# Patient Record
Sex: FEMALE | Race: BLACK OR AFRICAN AMERICAN | NOT HISPANIC OR LATINO | URBAN - METROPOLITAN AREA
[De-identification: names, ages, dates, MRNs, and addresses within clinical notes are randomized per-mention and may not be internally consistent; named-entity substitution may affect disease eponyms.]

---

## 2018-10-03 ENCOUNTER — OUTPATIENT (OUTPATIENT)
Dept: OUTPATIENT SERVICES | Facility: HOSPITAL | Age: 78
LOS: 1 days | End: 2018-10-03
Payer: COMMERCIAL

## 2018-10-03 DIAGNOSIS — E11.319 TYPE 2 DIABETES MELLITUS WITH UNSPECIFIED DIABETIC RETINOPATHY WITHOUT MACULAR EDEMA: ICD-10-CM

## 2018-10-03 DIAGNOSIS — R06.09 OTHER FORMS OF DYSPNEA: ICD-10-CM

## 2018-10-03 PROCEDURE — A9505: CPT

## 2018-10-03 PROCEDURE — 78452 HT MUSCLE IMAGE SPECT MULT: CPT

## 2018-10-03 PROCEDURE — A9500: CPT

## 2018-10-03 PROCEDURE — 93017 CV STRESS TEST TRACING ONLY: CPT

## 2021-03-29 ENCOUNTER — INPATIENT (INPATIENT)
Facility: HOSPITAL | Age: 81
LOS: 0 days | Discharge: HOME | DRG: 247 | End: 2021-03-30
Attending: INTERNAL MEDICINE | Admitting: INTERNAL MEDICINE
Payer: COMMERCIAL

## 2021-03-29 VITALS
DIASTOLIC BLOOD PRESSURE: 67 MMHG | WEIGHT: 175.05 LBS | TEMPERATURE: 98 F | RESPIRATION RATE: 18 BRPM | OXYGEN SATURATION: 99 % | HEIGHT: 61 IN | SYSTOLIC BLOOD PRESSURE: 188 MMHG | HEART RATE: 80 BPM

## 2021-03-29 DIAGNOSIS — I20.0 UNSTABLE ANGINA: ICD-10-CM

## 2021-03-29 DIAGNOSIS — E11.9 TYPE 2 DIABETES MELLITUS WITHOUT COMPLICATIONS: ICD-10-CM

## 2021-03-29 DIAGNOSIS — E78.00 PURE HYPERCHOLESTEROLEMIA, UNSPECIFIED: ICD-10-CM

## 2021-03-29 DIAGNOSIS — I10 ESSENTIAL (PRIMARY) HYPERTENSION: ICD-10-CM

## 2021-03-29 LAB
ALBUMIN SERPL ELPH-MCNC: 3.5 G/DL — SIGNIFICANT CHANGE UP (ref 3.3–5)
ALP SERPL-CCNC: 84 U/L — SIGNIFICANT CHANGE UP (ref 40–120)
ALT FLD-CCNC: 10 U/L — SIGNIFICANT CHANGE UP (ref 10–45)
ANION GAP SERPL CALC-SCNC: 9 MMOL/L — SIGNIFICANT CHANGE UP (ref 5–17)
APTT BLD: 24.5 SEC — LOW (ref 27.5–35.5)
AST SERPL-CCNC: 17 U/L — SIGNIFICANT CHANGE UP (ref 10–40)
BASOPHILS # BLD AUTO: 0.02 K/UL — SIGNIFICANT CHANGE UP (ref 0–0.2)
BASOPHILS NFR BLD AUTO: 0.5 % — SIGNIFICANT CHANGE UP (ref 0–2)
BILIRUB SERPL-MCNC: 0.4 MG/DL — SIGNIFICANT CHANGE UP (ref 0.2–1.2)
BUN SERPL-MCNC: 19 MG/DL — SIGNIFICANT CHANGE UP (ref 7–23)
CALCIUM SERPL-MCNC: 9.1 MG/DL — SIGNIFICANT CHANGE UP (ref 8.4–10.5)
CHLORIDE SERPL-SCNC: 106 MMOL/L — SIGNIFICANT CHANGE UP (ref 96–108)
CO2 SERPL-SCNC: 24 MMOL/L — SIGNIFICANT CHANGE UP (ref 22–31)
CREAT SERPL-MCNC: 1.26 MG/DL — SIGNIFICANT CHANGE UP (ref 0.5–1.3)
EOSINOPHIL # BLD AUTO: 0.07 K/UL — SIGNIFICANT CHANGE UP (ref 0–0.5)
EOSINOPHIL NFR BLD AUTO: 1.6 % — SIGNIFICANT CHANGE UP (ref 0–6)
GLUCOSE BLDC GLUCOMTR-MCNC: 114 MG/DL — HIGH (ref 70–99)
GLUCOSE BLDC GLUCOMTR-MCNC: 144 MG/DL — HIGH (ref 70–99)
GLUCOSE SERPL-MCNC: 171 MG/DL — HIGH (ref 70–99)
HCT VFR BLD CALC: 37.3 % — SIGNIFICANT CHANGE UP (ref 34.5–45)
HGB BLD-MCNC: 11.6 G/DL — SIGNIFICANT CHANGE UP (ref 11.5–15.5)
IMM GRANULOCYTES NFR BLD AUTO: 0.2 % — SIGNIFICANT CHANGE UP (ref 0–1.5)
INR BLD: 1.04 — SIGNIFICANT CHANGE UP (ref 0.88–1.16)
LYMPHOCYTES # BLD AUTO: 2.21 K/UL — SIGNIFICANT CHANGE UP (ref 1–3.3)
LYMPHOCYTES # BLD AUTO: 50.2 % — HIGH (ref 13–44)
MAGNESIUM SERPL-MCNC: 1.7 MG/DL — SIGNIFICANT CHANGE UP (ref 1.6–2.6)
MCHC RBC-ENTMCNC: 27.8 PG — SIGNIFICANT CHANGE UP (ref 27–34)
MCHC RBC-ENTMCNC: 31.1 GM/DL — LOW (ref 32–36)
MCV RBC AUTO: 89.4 FL — SIGNIFICANT CHANGE UP (ref 80–100)
MONOCYTES # BLD AUTO: 0.42 K/UL — SIGNIFICANT CHANGE UP (ref 0–0.9)
MONOCYTES NFR BLD AUTO: 9.5 % — SIGNIFICANT CHANGE UP (ref 2–14)
NEUTROPHILS # BLD AUTO: 1.67 K/UL — LOW (ref 1.8–7.4)
NEUTROPHILS NFR BLD AUTO: 38 % — LOW (ref 43–77)
NRBC # BLD: 0 /100 WBCS — SIGNIFICANT CHANGE UP (ref 0–0)
NT-PROBNP SERPL-SCNC: 152 PG/ML — SIGNIFICANT CHANGE UP (ref 0–300)
PLATELET # BLD AUTO: 207 K/UL — SIGNIFICANT CHANGE UP (ref 150–400)
POTASSIUM SERPL-MCNC: 4.7 MMOL/L — SIGNIFICANT CHANGE UP (ref 3.5–5.3)
POTASSIUM SERPL-SCNC: 4.7 MMOL/L — SIGNIFICANT CHANGE UP (ref 3.5–5.3)
PROT SERPL-MCNC: 7.4 G/DL — SIGNIFICANT CHANGE UP (ref 6–8.3)
PROTHROM AB SERPL-ACNC: 12.5 SEC — SIGNIFICANT CHANGE UP (ref 10.6–13.6)
RBC # BLD: 4.17 M/UL — SIGNIFICANT CHANGE UP (ref 3.8–5.2)
RBC # FLD: 14.3 % — SIGNIFICANT CHANGE UP (ref 10.3–14.5)
SARS-COV-2 RNA SPEC QL NAA+PROBE: SIGNIFICANT CHANGE UP
SODIUM SERPL-SCNC: 139 MMOL/L — SIGNIFICANT CHANGE UP (ref 135–145)
TROPONIN T SERPL-MCNC: 0.01 NG/ML — SIGNIFICANT CHANGE UP (ref 0–0.01)
WBC # BLD: 4.4 K/UL — SIGNIFICANT CHANGE UP (ref 3.8–10.5)
WBC # FLD AUTO: 4.4 K/UL — SIGNIFICANT CHANGE UP (ref 3.8–10.5)

## 2021-03-29 PROCEDURE — 99285 EMERGENCY DEPT VISIT HI MDM: CPT | Mod: 25

## 2021-03-29 PROCEDURE — 71045 X-RAY EXAM CHEST 1 VIEW: CPT | Mod: 26

## 2021-03-29 PROCEDURE — 93010 ELECTROCARDIOGRAM REPORT: CPT

## 2021-03-29 PROCEDURE — 99152 MOD SED SAME PHYS/QHP 5/>YRS: CPT

## 2021-03-29 PROCEDURE — 99223 1ST HOSP IP/OBS HIGH 75: CPT

## 2021-03-29 PROCEDURE — 93458 L HRT ARTERY/VENTRICLE ANGIO: CPT | Mod: 26,59

## 2021-03-29 PROCEDURE — 92928 PRQ TCAT PLMT NTRAC ST 1 LES: CPT | Mod: LC

## 2021-03-29 RX ORDER — ASPIRIN/CALCIUM CARB/MAGNESIUM 324 MG
325 TABLET ORAL ONCE
Refills: 0 | Status: COMPLETED | OUTPATIENT
Start: 2021-03-29 | End: 2021-03-29

## 2021-03-29 RX ORDER — ASPIRIN/CALCIUM CARB/MAGNESIUM 324 MG
81 TABLET ORAL DAILY
Refills: 0 | Status: DISCONTINUED | OUTPATIENT
Start: 2021-03-30 | End: 2021-03-30

## 2021-03-29 RX ORDER — CLOPIDOGREL BISULFATE 75 MG/1
75 TABLET, FILM COATED ORAL DAILY
Refills: 0 | Status: DISCONTINUED | OUTPATIENT
Start: 2021-03-30 | End: 2021-03-30

## 2021-03-29 RX ORDER — INSULIN LISPRO 100/ML
VIAL (ML) SUBCUTANEOUS
Refills: 0 | Status: DISCONTINUED | OUTPATIENT
Start: 2021-03-29 | End: 2021-03-30

## 2021-03-29 RX ORDER — DEXTROSE 50 % IN WATER 50 %
25 SYRINGE (ML) INTRAVENOUS ONCE
Refills: 0 | Status: DISCONTINUED | OUTPATIENT
Start: 2021-03-29 | End: 2021-03-30

## 2021-03-29 RX ORDER — MAGNESIUM SULFATE 500 MG/ML
2 VIAL (ML) INJECTION ONCE
Refills: 0 | Status: COMPLETED | OUTPATIENT
Start: 2021-03-29 | End: 2021-03-29

## 2021-03-29 RX ORDER — ACETAMINOPHEN 500 MG
1000 TABLET ORAL ONCE
Refills: 0 | Status: COMPLETED | OUTPATIENT
Start: 2021-03-29 | End: 2021-03-29

## 2021-03-29 RX ORDER — CARVEDILOL PHOSPHATE 80 MG/1
12.5 CAPSULE, EXTENDED RELEASE ORAL EVERY 12 HOURS
Refills: 0 | Status: DISCONTINUED | OUTPATIENT
Start: 2021-03-29 | End: 2021-03-30

## 2021-03-29 RX ORDER — SODIUM CHLORIDE 9 MG/ML
1000 INJECTION, SOLUTION INTRAVENOUS
Refills: 0 | Status: DISCONTINUED | OUTPATIENT
Start: 2021-03-29 | End: 2021-03-30

## 2021-03-29 RX ORDER — SODIUM CHLORIDE 9 MG/ML
500 INJECTION INTRAMUSCULAR; INTRAVENOUS; SUBCUTANEOUS
Refills: 0 | Status: DISCONTINUED | OUTPATIENT
Start: 2021-03-29 | End: 2021-03-30

## 2021-03-29 RX ORDER — LATANOPROST 0.05 MG/ML
1 SOLUTION/ DROPS OPHTHALMIC; TOPICAL AT BEDTIME
Refills: 0 | Status: DISCONTINUED | OUTPATIENT
Start: 2021-03-29 | End: 2021-03-30

## 2021-03-29 RX ORDER — DEXTROSE 50 % IN WATER 50 %
12.5 SYRINGE (ML) INTRAVENOUS ONCE
Refills: 0 | Status: DISCONTINUED | OUTPATIENT
Start: 2021-03-29 | End: 2021-03-30

## 2021-03-29 RX ORDER — ATORVASTATIN CALCIUM 80 MG/1
40 TABLET, FILM COATED ORAL AT BEDTIME
Refills: 0 | Status: DISCONTINUED | OUTPATIENT
Start: 2021-03-29 | End: 2021-03-30

## 2021-03-29 RX ORDER — SODIUM CHLORIDE 9 MG/ML
500 INJECTION INTRAMUSCULAR; INTRAVENOUS; SUBCUTANEOUS
Refills: 0 | Status: DISCONTINUED | OUTPATIENT
Start: 2021-03-29 | End: 2021-03-29

## 2021-03-29 RX ORDER — HYDRALAZINE HCL 50 MG
10 TABLET ORAL ONCE
Refills: 0 | Status: COMPLETED | OUTPATIENT
Start: 2021-03-29 | End: 2021-03-29

## 2021-03-29 RX ORDER — GLUCAGON INJECTION, SOLUTION 0.5 MG/.1ML
1 INJECTION, SOLUTION SUBCUTANEOUS ONCE
Refills: 0 | Status: DISCONTINUED | OUTPATIENT
Start: 2021-03-29 | End: 2021-03-30

## 2021-03-29 RX ORDER — LATANOPROST 0.05 MG/ML
1 SOLUTION/ DROPS OPHTHALMIC; TOPICAL
Qty: 0 | Refills: 0 | DISCHARGE

## 2021-03-29 RX ORDER — DEXTROSE 50 % IN WATER 50 %
15 SYRINGE (ML) INTRAVENOUS ONCE
Refills: 0 | Status: DISCONTINUED | OUTPATIENT
Start: 2021-03-29 | End: 2021-03-30

## 2021-03-29 RX ORDER — CLOPIDOGREL BISULFATE 75 MG/1
600 TABLET, FILM COATED ORAL ONCE
Refills: 0 | Status: COMPLETED | OUTPATIENT
Start: 2021-03-29 | End: 2021-03-29

## 2021-03-29 RX ORDER — CARVEDILOL PHOSPHATE 80 MG/1
1 CAPSULE, EXTENDED RELEASE ORAL
Qty: 0 | Refills: 0 | DISCHARGE

## 2021-03-29 RX ADMIN — CLOPIDOGREL BISULFATE 600 MILLIGRAM(S): 75 TABLET, FILM COATED ORAL at 13:01

## 2021-03-29 RX ADMIN — LATANOPROST 1 DROP(S): 0.05 SOLUTION/ DROPS OPHTHALMIC; TOPICAL at 22:21

## 2021-03-29 RX ADMIN — Medication 400 MILLIGRAM(S): at 21:12

## 2021-03-29 RX ADMIN — Medication 325 MILLIGRAM(S): at 13:01

## 2021-03-29 RX ADMIN — Medication 50 GRAM(S): at 14:35

## 2021-03-29 RX ADMIN — ATORVASTATIN CALCIUM 40 MILLIGRAM(S): 80 TABLET, FILM COATED ORAL at 22:21

## 2021-03-29 RX ADMIN — SODIUM CHLORIDE 75 MILLILITER(S): 9 INJECTION INTRAMUSCULAR; INTRAVENOUS; SUBCUTANEOUS at 14:36

## 2021-03-29 RX ADMIN — CARVEDILOL PHOSPHATE 12.5 MILLIGRAM(S): 80 CAPSULE, EXTENDED RELEASE ORAL at 18:28

## 2021-03-29 RX ADMIN — SODIUM CHLORIDE 75 MILLILITER(S): 9 INJECTION INTRAMUSCULAR; INTRAVENOUS; SUBCUTANEOUS at 21:13

## 2021-03-29 RX ADMIN — Medication 1000 MILLIGRAM(S): at 21:40

## 2021-03-29 RX ADMIN — Medication 10 MILLIGRAM(S): at 18:15

## 2021-03-29 NOTE — ED PROVIDER NOTE - CONSTITUTIONAL, MLM
Elderly female. Well appearing, awake, alert, oriented to person, place, time/situation and in no apparent distress. normal... Elderly female. Well appearing, awake, alert, oriented and in no apparent distress.

## 2021-03-29 NOTE — PROGRESS NOTE ADULT - SUBJECTIVE AND OBJECTIVE BOX
PA Event Note    Provider called to bedside. BP: 210/81, HR: 80s, asymptomatic. Hydralazine 10 mg IV x one dose given. Patient did not take her home Coreg 12.5 mg orally BID today; will administer home medication when available from pharmacy.

## 2021-03-29 NOTE — H&P ADULT - NS MD HP PULSE CAROTID
Pt with indwelling tyson, seen for urinary sx, no leukocytosis, no fever. Treated for UTI with keflex. No antifungal. Unclear if pt would need to start antifungal, given only 10-50k CFU and pt likely colonized. Results forwarded to Dr. Johnson. 2+ B/L, no bruit/right normal/left normal

## 2021-03-29 NOTE — H&P ADULT - PROBLEM SELECTOR PLAN 4
Hx of diabetes   - Home meds include Metformin 500 mg once daily, Glipizide 5 mg once daily, and Januvia 50 mg once daily   - Continue MISS  - F/u A1c    F: 75 cc/hr x 4 hours  E: K >4, Mg > 2  N: NPO for cath   Dvt: pending cath   Dispo: awaiting cath

## 2021-03-29 NOTE — H&P ADULT - PROBLEM SELECTOR PLAN 2
SBP on admission 188/67 mmHg  - Continue home meds: Carvedilol 12.5 mg BID and Lisinopril 40 mg QD  - Holding Lisinopril pending cath

## 2021-03-29 NOTE — H&P ADULT - ASSESSMENT
Pt is an 79 y/o F, with PMHx of HTN, HLD, DM, who presented to [facility] ED on 03/29/2021 endorsing dyspnea on exertion, worsening over the past 3 years, now experiencing with exertion of ½ block. Patient also endorses episodic dizziness with sitting/walking which resolves with laying down. Pt was referred for a CTA coronaries and was not able to tolerate getting the needle prior to the test. Echocardiogram on 03/11/21 showed grossly normal LV function. Endocardium not well visualized. Mild concentric LVH. EF 55-60%. Mild (Grade 1) diastolic dysfunction. LA/RA/RV normal. Trivial MR. PASA not able to be estimated 2/2 insufficient tricuspid regurgitant jet. No pericardial effusion. Patient admitted to cardiology/telemetry for unstable angina and will undergo cardiac catheterization with Dr. Godfrey today 03/29/2021

## 2021-03-29 NOTE — ED ADULT NURSE NOTE - OBJECTIVE STATEMENT
pt c/o SOBOE , denies CP /cough/fever pt c/o SOBOE , denies CP /cough/fever, pt referred in for cardiac cath

## 2021-03-29 NOTE — ED PROVIDER NOTE - OBJECTIVE STATEMENT
79 y/o F PMHx HTN, DM, no CAD (as far as Pt recalls), presents to ED for cardiac catheterization by Dr. Godfrey. Pt states that she saw her cardiologist Dr. Gloria Collins for exertional sob that she has had for the past 3 years, which has been progressively worsening for the past few months. She is unable to walk half a block without experiencing sob. She reports associated episodic dizziness, which resolves if she lays down. Denies chest pain, diaphoresis, LE edema, orthopnea, palpitations, N/V, syncope, fevers/chills, cough, loss of taste/smell, hx covid, and sick contacts. Pt was initially referred to an outpatient study but couldn't "tolerate it well", so she was sent to ED for admission to Dr. Godfrey for cardiac catheterization. At time of visit, Pt currently denies sob and chest pain.

## 2021-03-29 NOTE — H&P ADULT - PROBLEM SELECTOR PLAN 1
- On admit: Patient endorsing dyspnea on exertion over the past 3 years, worsening over the past few months   - Patient currently denies CP or SOB   - Troponin 0.01 EKG: NSR @ 70 bpm, no acute ST-T wave changes   - ECHO ordered  - PLAN for ischemic eval - cath today with Dr. Godfrey  - Precathed and consented; loaded with Aspirin 325 mg and Plavix 600 mg   - IV fluids started at 75 cc/hr x 4 hours   - Risks & benefits of procedure and alternative therapy have been explained to the patient including but not limited to: allergic reaction, bleeding w/possible need for blood transfusion, infection, renal and vascular compromise, limb damage, arrhythmia, stroke, vessel dissection/perforation, Myocardial infarction, emergent CABG. Informed consent obtained and in chart.     - F/u lipid and Hgb a1c  - TELE MONITORING

## 2021-03-29 NOTE — H&P ADULT - HISTORY OF PRESENT ILLNESS
Pt is an 81 y/o F, with PMHx of HTN, HLD, DM, who presented to [facility] ED on 2021 endorsing dyspnea on exertion, worsening over the past 3 years, now experiencing with exertion of ½ block. Patient also endorses episodic dizziness with sitting/walking which resolves with laying down. Pt denies chest pain, diaphoresis, LE edema, orthopnea, palpitations, N/V, syncope, fevers/chills, cough, loss of taste/smell, h/o COVID, or contact with COVID positive people.   Pt was referred for a CTA coronaries and was not able to tolerate getting the needle prior to the test. Echocardiogram on 21 showed grossly normal LV function. Endocardium not well visualized. Mild concentric LVH. EF 55-60%. Mild (Grade 1) diastolic dysfunction. LA/RA/RV normal. Trivial MR. PASA not able to be estimated 2/2 insufficient tricuspid regurgitant jet. No pericardial effusion.   In the ED, VS: T: 98.4, BP: 188/67 mmHg, RR: 18 breaths/min, T: 98.4, spO2: 99% on RA. Labs significant for: M.7. CXR:  Heart size within normal limits, thoracic aortic calcification. Left basilar focal atelectasis. Right hilar carol ann calcification. Thoracic spine and bilateral AC joint degenerative changes. COVID-19 PCR:   Patient received: Magnesium 2g IVP x 1, Aspirin 325 mg PO x 1, and Plavix 600 mg PO x 1.   Patient admitted to cardiology/telemetry for unstable angina and will undergo cardiac catheterization with Dr. Godfrey today 2021.    Pt is an 79 y/o F, with PMHx of HTN, HLD, DM, who presented to [facility] ED on 2021 endorsing dyspnea on exertion, worsening over the past 3 years, now experiencing with exertion of ½ block. Patient also endorses episodic dizziness with sitting/walking which resolves with laying down. Pt denies chest pain, diaphoresis, LE edema, orthopnea, palpitations, N/V, syncope, fevers/chills, cough, loss of taste/smell, h/o COVID, or contact with COVID positive people.   Pt was referred for a CTA coronaries and was not able to tolerate getting the needle prior to the test. Echocardiogram on 21 showed grossly normal LV function. Endocardium not well visualized. Mild concentric LVH. EF 55-60%. Mild (Grade 1) diastolic dysfunction. LA/RA/RV normal. Trivial MR. PASA not able to be estimated 2/2 insufficient tricuspid regurgitant jet. No pericardial effusion.   In the ED, VS: T: 98.4, BP: 188/67 mmHg, RR: 18 breaths/min, T: 98.4, spO2: 99% on RA. Labs significant for: M.7. Troponin: 0.01. EKG: NSR @ 70 bpm, no acute ST-T wave changes.  CXR:  Heart size within normal limits, thoracic aortic calcification. Left basilar focal atelectasis. Right hilar carol ann calcification. Thoracic spine and bilateral AC joint degenerative changes. COVID-19 PCR: pending  Patient received: Magnesium 2g IVP x 1, Aspirin 325 mg PO x 1, and Plavix 600 mg PO x 1.   Patient admitted to cardiology/telemetry for unstable angina and will undergo cardiac catheterization with Dr. Godfrey today 2021.

## 2021-03-29 NOTE — ED ADULT TRIAGE NOTE - NS ED TRIAGE AVPU SCALE
Karissa Moroe is a [de-identified]year old female. HPI:   She has been doing well generally. IHD- doing well, follow up Dr Nevaeh Warner. She had stents Nov 2017, then again June 2020. DM - I0R 7.9, Trulicity has been increased by Dr Edel Marion.     HTN    Hyperlipidemia E11.8 without insulin use. 200 each 1   • losartan Potassium 50 MG Oral Tab Take 1 tablet (50 mg total) by mouth daily. 90 tablet 3   • JUBLIA 10 % External Solution daily. • Pimecrolimus 1 % External Cream 2 (two) times daily as needed.  APPLY TO AFF (36.6 °C) (Oral)   Resp 12   Wt 208 lb (94.3 kg)   LMP  (LMP Unknown)   SpO2 98%   BMI 34.61 kg/m²   GENERAL: well developed, well nourished,in no apparent distress  SKIN: no rashes,no suspicious lesions  HEENT: atraumatic, normocephalic,ears and throat ar Alert-The patient is alert, awake and responds to voice. The patient is oriented to time, place, and person. The triage nurse is able to obtain subjective information.

## 2021-03-29 NOTE — H&P ADULT - RS GEN PE MLT RESP DETAILS PC
airway patent/breath sounds equal/good air movement/respirations non-labored/clear to auscultation bilaterally/no chest wall tenderness/no intercostal retractions/no rales/no rhonchi/no subcutaneous emphysema

## 2021-03-29 NOTE — ED PROVIDER NOTE - CLINICAL SUMMARY MEDICAL DECISION MAKING FREE TEXT BOX
Pleasant 79 y/o F PMHx HTN, DM, presents to ED for cardiac catheterization by Dr. Godfrey. Pt reports sob for the past 3 years, which has been progressively worsening for the past few months. On exam, Pt appears well and nontoxic. Cardiac workup initiated, covid swab and chest xray given. Will admit to cardiology to Dr. Godfrey.   On labs, Trop levels noted to be normal.   On chest xray, some left basilar atelectasis noted. heart size is normal. No acute bone injury. Pleasant 81 y/o F PMHx HTN, DM, presents to ED for cardiac catheterization by Dr. Godfrey. Pt reports sob for the past 3 years, which has been progressively worsening for the past few months. On exam, Pt appears well and nontoxic. Cardiac workup initiated, covid swab and chest xray given. Will admit to cardiology to Dr. Godfrey.   On labs, Trop levels noted to be normal.   On chest xray, some left basilar atelectasis noted. heart size is normal. No acute bone injury. Pt admitted for cardiac cath. Stable in ED.

## 2021-03-30 VITALS
SYSTOLIC BLOOD PRESSURE: 157 MMHG | DIASTOLIC BLOOD PRESSURE: 69 MMHG | HEART RATE: 59 BPM | RESPIRATION RATE: 20 BRPM | OXYGEN SATURATION: 97 %

## 2021-03-30 LAB
A1C WITH ESTIMATED AVERAGE GLUCOSE RESULT: 8 % — HIGH (ref 4–5.6)
ALBUMIN SERPL ELPH-MCNC: 3.2 G/DL — LOW (ref 3.3–5)
ALP SERPL-CCNC: 83 U/L — SIGNIFICANT CHANGE UP (ref 40–120)
ALT FLD-CCNC: 10 U/L — SIGNIFICANT CHANGE UP (ref 10–45)
ANION GAP SERPL CALC-SCNC: 11 MMOL/L — SIGNIFICANT CHANGE UP (ref 5–17)
AST SERPL-CCNC: 17 U/L — SIGNIFICANT CHANGE UP (ref 10–40)
BASOPHILS # BLD AUTO: 0.02 K/UL — SIGNIFICANT CHANGE UP (ref 0–0.2)
BASOPHILS NFR BLD AUTO: 0.4 % — SIGNIFICANT CHANGE UP (ref 0–2)
BILIRUB SERPL-MCNC: 0.4 MG/DL — SIGNIFICANT CHANGE UP (ref 0.2–1.2)
BUN SERPL-MCNC: 20 MG/DL — SIGNIFICANT CHANGE UP (ref 7–23)
CALCIUM SERPL-MCNC: 8.9 MG/DL — SIGNIFICANT CHANGE UP (ref 8.4–10.5)
CHLORIDE SERPL-SCNC: 107 MMOL/L — SIGNIFICANT CHANGE UP (ref 96–108)
CO2 SERPL-SCNC: 20 MMOL/L — LOW (ref 22–31)
COVID-19 SPIKE DOMAIN AB INTERP: POSITIVE
COVID-19 SPIKE DOMAIN ANTIBODY RESULT: >250 U/ML — HIGH
CREAT SERPL-MCNC: 1.32 MG/DL — HIGH (ref 0.5–1.3)
EOSINOPHIL # BLD AUTO: 0.07 K/UL — SIGNIFICANT CHANGE UP (ref 0–0.5)
EOSINOPHIL NFR BLD AUTO: 1.5 % — SIGNIFICANT CHANGE UP (ref 0–6)
ESTIMATED AVERAGE GLUCOSE: 183 MG/DL — HIGH (ref 68–114)
GLUCOSE BLDC GLUCOMTR-MCNC: 148 MG/DL — HIGH (ref 70–99)
GLUCOSE BLDC GLUCOMTR-MCNC: 196 MG/DL — HIGH (ref 70–99)
GLUCOSE SERPL-MCNC: 185 MG/DL — HIGH (ref 70–99)
HCT VFR BLD CALC: 35.7 % — SIGNIFICANT CHANGE UP (ref 34.5–45)
HGB BLD-MCNC: 11.1 G/DL — LOW (ref 11.5–15.5)
IMM GRANULOCYTES NFR BLD AUTO: 0.2 % — SIGNIFICANT CHANGE UP (ref 0–1.5)
LYMPHOCYTES # BLD AUTO: 2.39 K/UL — SIGNIFICANT CHANGE UP (ref 1–3.3)
LYMPHOCYTES # BLD AUTO: 49.9 % — HIGH (ref 13–44)
MAGNESIUM SERPL-MCNC: 2.2 MG/DL — SIGNIFICANT CHANGE UP (ref 1.6–2.6)
MCHC RBC-ENTMCNC: 28 PG — SIGNIFICANT CHANGE UP (ref 27–34)
MCHC RBC-ENTMCNC: 31.1 GM/DL — LOW (ref 32–36)
MCV RBC AUTO: 89.9 FL — SIGNIFICANT CHANGE UP (ref 80–100)
MONOCYTES # BLD AUTO: 0.3 K/UL — SIGNIFICANT CHANGE UP (ref 0–0.9)
MONOCYTES NFR BLD AUTO: 6.3 % — SIGNIFICANT CHANGE UP (ref 2–14)
NEUTROPHILS # BLD AUTO: 2 K/UL — SIGNIFICANT CHANGE UP (ref 1.8–7.4)
NEUTROPHILS NFR BLD AUTO: 41.7 % — LOW (ref 43–77)
NRBC # BLD: 0 /100 WBCS — SIGNIFICANT CHANGE UP (ref 0–0)
PLATELET # BLD AUTO: 195 K/UL — SIGNIFICANT CHANGE UP (ref 150–400)
POTASSIUM SERPL-MCNC: 4.2 MMOL/L — SIGNIFICANT CHANGE UP (ref 3.5–5.3)
POTASSIUM SERPL-SCNC: 4.2 MMOL/L — SIGNIFICANT CHANGE UP (ref 3.5–5.3)
PROT SERPL-MCNC: 6.9 G/DL — SIGNIFICANT CHANGE UP (ref 6–8.3)
RBC # BLD: 3.97 M/UL — SIGNIFICANT CHANGE UP (ref 3.8–5.2)
RBC # FLD: 14.6 % — HIGH (ref 10.3–14.5)
SARS-COV-2 IGG+IGM SERPL QL IA: >250 U/ML — HIGH
SARS-COV-2 IGG+IGM SERPL QL IA: POSITIVE
SODIUM SERPL-SCNC: 138 MMOL/L — SIGNIFICANT CHANGE UP (ref 135–145)
WBC # BLD: 4.79 K/UL — SIGNIFICANT CHANGE UP (ref 3.8–10.5)
WBC # FLD AUTO: 4.79 K/UL — SIGNIFICANT CHANGE UP (ref 3.8–10.5)

## 2021-03-30 PROCEDURE — 85610 PROTHROMBIN TIME: CPT

## 2021-03-30 PROCEDURE — U0005: CPT

## 2021-03-30 PROCEDURE — 82962 GLUCOSE BLOOD TEST: CPT

## 2021-03-30 PROCEDURE — 99285 EMERGENCY DEPT VISIT HI MDM: CPT | Mod: 25

## 2021-03-30 PROCEDURE — C1725: CPT

## 2021-03-30 PROCEDURE — C1887: CPT

## 2021-03-30 PROCEDURE — 85730 THROMBOPLASTIN TIME PARTIAL: CPT

## 2021-03-30 PROCEDURE — 93306 TTE W/DOPPLER COMPLETE: CPT | Mod: 26

## 2021-03-30 PROCEDURE — C1894: CPT

## 2021-03-30 PROCEDURE — 85025 COMPLETE CBC W/AUTO DIFF WBC: CPT

## 2021-03-30 PROCEDURE — 80053 COMPREHEN METABOLIC PANEL: CPT

## 2021-03-30 PROCEDURE — 93306 TTE W/DOPPLER COMPLETE: CPT

## 2021-03-30 PROCEDURE — 83880 ASSAY OF NATRIURETIC PEPTIDE: CPT

## 2021-03-30 PROCEDURE — C1769: CPT

## 2021-03-30 PROCEDURE — 99232 SBSQ HOSP IP/OBS MODERATE 35: CPT

## 2021-03-30 PROCEDURE — C1760: CPT

## 2021-03-30 PROCEDURE — C1874: CPT

## 2021-03-30 PROCEDURE — 36415 COLL VENOUS BLD VENIPUNCTURE: CPT

## 2021-03-30 PROCEDURE — 80061 LIPID PANEL: CPT

## 2021-03-30 PROCEDURE — 71045 X-RAY EXAM CHEST 1 VIEW: CPT

## 2021-03-30 PROCEDURE — 84484 ASSAY OF TROPONIN QUANT: CPT

## 2021-03-30 PROCEDURE — 83036 HEMOGLOBIN GLYCOSYLATED A1C: CPT

## 2021-03-30 PROCEDURE — 83735 ASSAY OF MAGNESIUM: CPT

## 2021-03-30 PROCEDURE — 86769 SARS-COV-2 COVID-19 ANTIBODY: CPT

## 2021-03-30 PROCEDURE — U0003: CPT

## 2021-03-30 RX ORDER — METFORMIN HYDROCHLORIDE 850 MG/1
1 TABLET ORAL
Qty: 0 | Refills: 0 | DISCHARGE

## 2021-03-30 RX ORDER — ASPIRIN/CALCIUM CARB/MAGNESIUM 324 MG
1 TABLET ORAL
Qty: 30 | Refills: 6
Start: 2021-03-30 | End: 2021-10-25

## 2021-03-30 RX ORDER — ATORVASTATIN CALCIUM 80 MG/1
1 TABLET, FILM COATED ORAL
Qty: 30 | Refills: 3
Start: 2021-03-30 | End: 2021-07-27

## 2021-03-30 RX ORDER — ACETAMINOPHEN 500 MG
650 TABLET ORAL ONCE
Refills: 0 | Status: COMPLETED | OUTPATIENT
Start: 2021-03-30 | End: 2021-03-30

## 2021-03-30 RX ORDER — CARVEDILOL PHOSPHATE 80 MG/1
1 CAPSULE, EXTENDED RELEASE ORAL
Qty: 60 | Refills: 1
Start: 2021-03-30 | End: 2021-05-28

## 2021-03-30 RX ORDER — CLOPIDOGREL BISULFATE 75 MG/1
1 TABLET, FILM COATED ORAL
Qty: 30 | Refills: 6
Start: 2021-03-30 | End: 2021-10-25

## 2021-03-30 RX ADMIN — Medication 650 MILLIGRAM(S): at 06:03

## 2021-03-30 RX ADMIN — CLOPIDOGREL BISULFATE 75 MILLIGRAM(S): 75 TABLET, FILM COATED ORAL at 12:18

## 2021-03-30 RX ADMIN — Medication 2: at 06:51

## 2021-03-30 RX ADMIN — Medication 81 MILLIGRAM(S): at 12:18

## 2021-03-30 RX ADMIN — Medication 650 MILLIGRAM(S): at 06:53

## 2021-03-30 RX ADMIN — CARVEDILOL PHOSPHATE 12.5 MILLIGRAM(S): 80 CAPSULE, EXTENDED RELEASE ORAL at 05:25

## 2021-03-30 NOTE — PHYSICAL THERAPY INITIAL EVALUATION ADULT - PERTINENT HX OF CURRENT PROBLEM, REHAB EVAL
who presented to [facility] ED on 03/29/2021 endorsing dyspnea on exertion, worsening over the past 3 years, now experiencing with exertion of ½ block. Patient also endorses episodic dizziness with sitting/walking which resolves with laying down.

## 2021-03-30 NOTE — DISCHARGE NOTE NURSING/CASE MANAGEMENT/SOCIAL WORK - PATIENT PORTAL LINK FT
You can access the FollowMyHealth Patient Portal offered by [facility] by registering at the following website: [email or web address]. By joining [employer]’s FollowMyHealth portal, you will also be able to view your health information using other applications (apps) compatible with our system.

## 2021-03-30 NOTE — DISCHARGE NOTE PROVIDER - HOSPITAL COURSE
79 y/o F, with PMHx of HTN, HLD and DM, who presented to [facility] ED on 03/29/2021 endorsing worsening GAMING over the past 3 years, now experiencing with exertion of ½ block. Patient also endorses episodic dizziness with sitting/walking which resolves with laying down. In ED VSS, Trop negative, EKG non ischemic, and pt was referred for a CTA coronaries and was not able to tolerate getting the needle prior to the test. Prior Echocardiogram on 03/11/21 showed grossly normal LV function. Endocardium not well visualized. Mild concentric LVH. EF 55-60%. Mild (Grade 1) diastolic dysfunction. LA/RA/RV normal. Trivial MR. PASA not able to be estimated 2/2 insufficient tricuspid regurgitant jet. No pericardial effusion. Patient admitted to cardiology/telemetry for unstable angina. Pt now s/p cardiac cath 3/29/21 with APRIL OM1, EF 60%, EDP 15, access R groin PC and R radial. Pt seen and examined at bedside this AM without any complaints or events overnight, access site stable non TTP, without ecchymosis, bleeding or hematoma, pulse 2+ no bruits. VSS, labs and telemetry reviewed and pt stable for discharge as discussed with Dr. Pryor. Pt has received appropriate discharge instructions, including medication regimen, access site management and follow up with Dr. Collins in 1-2 weeks. 81 y/o F, with PMHx of HTN, HLD and DM, who presented to [facility] ED on 03/29/2021 endorsing worsening GAMING over the past 3 years, now experiencing with exertion of ½ block. Patient also endorses episodic dizziness with sitting/walking which resolves with laying down. In ED VSS, Trop negative, EKG non ischemic, and pt was referred for a CTA coronaries and was not able to tolerate getting the needle prior to the test. Prior Echocardiogram on 03/11/21 showed grossly normal LV function. Endocardium not well visualized. Mild concentric LVH. EF 55-60%. Mild (Grade 1) diastolic dysfunction. LA/RA/RV normal. Trivial MR. PASA not able to be estimated 2/2 insufficient tricuspid regurgitant jet. No pericardial effusion. Patient admitted to cardiology/telemetry for unstable angina.   Pt now s/p cardiac cath 3/29/21 with APRIL OM1, EF 60%, EDP 15, access R groin PC and R radial. Pt seen and examined at bedside this AM without any complaints or events overnight, access site stable non TTP, without ecchymosis, bleeding or hematoma, pulse 2+ no bruits. ECHO performed s/f  Normal left and right ventricular size and systolic function and No significant valvular disease. VSS, labs and telemetry reviewed and pt stable for discharge as discussed with Dr. Pryor. Pt has received appropriate discharge instructions, including medication regimen, access site management and follow up with Dr. Collins in 1-2 weeks.    DC Meds:   ASA 81mg  Atorvastatin 40mg  Coreg 12.5mg BID  Plavix 75mg  Lisinopril 40mg 79 y/o F, with PMHx of HTN, HLD and DM, who presented to [facility] ED on 03/29/2021 endorsing worsening GAMING over the past 3 years, now experiencing with exertion of ½ block. Patient also endorses episodic dizziness with sitting/walking which resolves with laying down. In ED VSS, Trop negative, EKG non ischemic, and pt was referred for a CTA coronaries and was not able to tolerate getting the needle prior to the test. Prior Echocardiogram on 03/11/21 showed grossly normal LV function. Endocardium not well visualized. Mild concentric LVH. EF 55-60%. Mild (Grade 1) diastolic dysfunction. LA/RA/RV normal. Trivial MR. PASA not able to be estimated 2/2 insufficient tricuspid regurgitant jet. No pericardial effusion. Patient admitted to cardiology/telemetry for unstable angina.   Pt now s/p cardiac cath 3/29/21 with APRIL OM1, EF 60%, EDP 15, access R groin PC and R radial. Pt seen and examined at bedside this AM without any complaints or events overnight, access site stable non TTP, without ecchymosis, bleeding or hematoma, pulse 2+ no bruits. ECHO performed s/f  Normal left and right ventricular size and systolic function and No significant valvular disease. VSS, labs and telemetry reviewed and pt stable for discharge as discussed with Dr. Pryor. Pt has received appropriate discharge instructions, including medication regimen, access site management and follow up with Dr. Collins in 1-2 weeks. PT eval- home w/no needs     DC Meds:   ASA 81mg  Atorvastatin 40mg  Coreg 12.5mg BID  Plavix 75mg  Lisinopril 40mg 79 y/o F, with PMHx of HTN, HLD and DM, who presented to [facility] ED on 03/29/2021 endorsing worsening GAMING over the past 3 years, now experiencing with exertion of ½ block. Patient also endorses episodic dizziness with sitting/walking which resolves with laying down. In ED VSS, Trop negative, EKG non ischemic, and pt was referred for a CTA coronaries and was not able to tolerate getting the needle prior to the test. Prior Echocardiogram on 03/11/21 showed grossly normal LV function. Endocardium not well visualized. Mild concentric LVH. EF 55-60%. Mild (Grade 1) diastolic dysfunction. LA/RA/RV normal. Trivial MR. PASA not able to be estimated 2/2 insufficient tricuspid regurgitant jet. No pericardial effusion. Patient admitted to cardiology/telemetry for unstable angina.   Pt now s/p cardiac cath 3/29/21 with APRIL OM1, EF 60%, EDP 15, access R groin PC and R radial. Pt seen and examined at bedside this AM without any complaints or events overnight, access site stable non TTP, without ecchymosis, bleeding or hematoma, pulse 2+ no bruits. ECHO performed s/f  Normal left and right ventricular size and systolic function and No significant valvular disease. VSS, labs and telemetry reviewed and pt stable for discharge as discussed with Dr. Pryor. Pt has received appropriate discharge instructions, including medication regimen, access site management and follow up with Dr. Collins in 1-2 weeks. PT eval- home w/no needs     DC Meds:   ASA 81mg  Atorvastatin 40mg  Coreg 12.5mg BID  Plavix 75mg  Lisinopril 40mg     Note was signed after patient was discharged from the hospital.  I was physically present for the key portions of the evaluation and management (E/M) service. I agree with the above discharge documentation which I have reviewed and edited where appropriate. I I have reviewed vitals, labs, medications, cardiac studies and remaining additional imaging. Patient is hemodynamically stable and appropriate for discharge home on the above prescribed medications,

## 2021-03-30 NOTE — DISCHARGE NOTE PROVIDER - NSDCCPCAREPLAN_GEN_ALL_CORE_FT
PRINCIPAL DISCHARGE DIAGNOSIS  Diagnosis: CAD (coronary artery disease)  Assessment and Plan of Treatment: You were found to have blockages in the arteries of your heart, also known as Coronary Artery Disease. You underwent a cardiac angiogram on 3/29/21 and received a stent to the obtuse marginal artery.  PLEASE CONTINUE ASPIRIN 81MG DAILY AND PLAVIX 75MG DAILY. DO NOT STOP THESE MEDICATIONS FOR ANY REASON AS THEY ARE KEEPING YOUR STENT OPEN AND PREVENTING A HEART ATTACK.   Avoid strenuous activity or heavy lifting anything more than 5lbs for the next five days. Do not take a bath or swim for the next five days; you may shower. For any bleeding or hematoma formation (hardened blood collection under the skin) at the access site of your right wrist and right groin please hold pressure and go to the emergency room. Please follow up with Dr. Collins in 1-2 weeks. For recurrent chest pain, please call your doctor or go to the emergency room.      SECONDARY DISCHARGE DIAGNOSES  Diagnosis: Hypertension  Assessment and Plan of Treatment: Please continue ___ as listed to keep your blood pressure controlled. For blood pressure that is too high or too low please see your doctor or go to the emergency room as necessary.    Diagnosis: High cholesterol  Assessment and Plan of Treatment: Please continue ____ at bedtime to keep your cholesterol low. High cholesterol contributes to heart disease.    Diagnosis: Diabetes  Assessment and Plan of Treatment: Your Hemoglobin A1c is ___ and your goal A1c is less than 7.0%. This number measures your average blood sugar level over the last three months. Please continue your medications as listed for diabetes. Maintain a low carbohydrate, low sugar diet, exercise, monitor your fingerstick blood sugars regarly and follow up with your Endocrinologist/Primary Care Doctor.     PRINCIPAL DISCHARGE DIAGNOSIS  Diagnosis: CAD (coronary artery disease)  Assessment and Plan of Treatment: - You underwent a cardiac catheterization and  were found to have blockages in the arteries of your heart, also known as Coronary Artery Disease. You underwent a cardiac angiogram on 3/29/21 and received a stent to the obtuse marginal artery.  - PLEASE CONTINUE ASPIRIN 81MG DAILY AND PLAVIX 75MG DAILY. DO NOT STOP THESE MEDICATIONS FOR ANY REASON AS THEY ARE KEEPING YOUR STENT OPEN AND PREVENTING A HEART ATTACK.   - Avoid strenuous activity or heavy lifting anything more than 5lbs for the next five days. Do not take a bath or swim for the next five days; you may shower. For any bleeding or hematoma formation (hardened blood collection under the skin) at the access site of your right wrist and right groin please hold pressure and go to the emergency room.  -  Please follow up with Dr. Collins in 1-2 weeks. For recurrent chest pain, please call your doctor or go to the emergency room.      SECONDARY DISCHARGE DIAGNOSES  Diagnosis: Diabetes  Assessment and Plan of Treatment: - Your Hemoglobin A1c is 8.0 and your goal A1c is less than 7.0%. This number measures your average blood sugar level over the last three months.   - You have a known history of diabetes for which you take Metformin. As you received contrast intravenously (through your IV) it can affect your kidney function and an accumulation of metformin can happen leading to HYPOGLYCEMIA. For this reason you are to HOLD metformin for 48 hours and you may RESUME IT ON 4/1/21. You may continue your Glipizide and Januvia as scheduled.    Diagnosis: High cholesterol  Assessment and Plan of Treatment: Your LDL is 132 and your goal LDL is less than 70. LDL is also known as "bad cholesterol" because it takes cholesterol to your arteries, where it may collect in artery walls. Too much cholesterol in your arteries may lead to a buildup of plaque known as atherosclerosis and contribute to heart disease. Please continue Atorvastatin 40mg as prescribed without missing doses.

## 2021-03-30 NOTE — DISCHARGE NOTE PROVIDER - NSDCFUADDINST_GEN_ALL_CORE_FT
- PLEASE CONTINUE ASPIRIN 81MG DAILY AND PLAVIX 75MG DAILY. DO NOT STOP THESE MEDICATIONS FOR ANY REASON AS THEY ARE KEEPING YOUR STENT OPEN AND PREVENTING A HEART ATTACK.  - Avoid strenuous activity or heavy lifting anything more than 5lbs for the next five days. Do not take a bath or swim for the next five days; you may shower. For any bleeding or hematoma formation (hardened blood collection under the skin) at the access site of your right wrist and right groin please hold pressure and go to the emergency room.

## 2021-03-30 NOTE — PHYSICAL THERAPY INITIAL EVALUATION ADULT - GENERAL OBSERVATIONS, REHAB EVAL
Pt received semi supine in bed +hep lock +EKG. PT received consent to treat from FROILAN Ahmadi.Pt was left as received with call bell in reach, VSS, and in NAD.

## 2021-03-30 NOTE — PHYSICAL THERAPY INITIAL EVALUATION ADULT - ADDITIONAL COMMENTS
Pt states she lives in elevator building alone. Pt was independent with amb and most ADL's. Pt has someone who comes to help her clean throughout the week.

## 2021-03-30 NOTE — DISCHARGE NOTE PROVIDER - CARE PROVIDER_API CALL
BLAINE HOOD  Cardiology  [address] [place] [address]  Phone: [phone]  Fax: [phone]  Follow Up Time: 1 week

## 2021-03-30 NOTE — DISCHARGE NOTE PROVIDER - NSDCMRMEDTOKEN_GEN_ALL_CORE_FT
glipiZIDE 5 mg oral tablet: 1 tab(s) orally once a day  Januvia 50 mg oral tablet: 1 tab(s) orally once a day  latanoprost 0.005% ophthalmic solution: 1 drop(s) to each affected eye once a day (in the evening)  lisinopril 40 mg oral tablet: 1 tab(s) orally once a day  metFORMIN 500 mg oral tablet, extended release: 1 tab(s) orally once a day   aspirin 81 mg oral delayed release tablet: 1 tab(s) orally once a day  atorvastatin 40 mg oral tablet: 1 tab(s) orally once a day (at bedtime)   carvedilol 12.5 mg oral tablet: 1 tab(s) orally every 12 hours  clopidogrel 75 mg oral tablet: 1 tab(s) orally once a day  glipiZIDE 5 mg oral tablet: 1 tab(s) orally once a day  Januvia 50 mg oral tablet: 1 tab(s) orally once a day  latanoprost 0.005% ophthalmic solution: 1 drop(s) to each affected eye once a day (in the evening)  lisinopril 40 mg oral tablet: 1 tab(s) orally once a day  metFORMIN 500 mg oral tablet, extended release: 1 tab(s) orally once a day. RESUME 4/1/21

## 2021-04-06 DIAGNOSIS — I20.0 UNSTABLE ANGINA: ICD-10-CM

## 2021-04-06 DIAGNOSIS — E78.5 HYPERLIPIDEMIA, UNSPECIFIED: ICD-10-CM

## 2021-04-06 DIAGNOSIS — I10 ESSENTIAL (PRIMARY) HYPERTENSION: ICD-10-CM

## 2021-04-06 DIAGNOSIS — I25.110 ATHEROSCLEROTIC HEART DISEASE OF NATIVE CORONARY ARTERY WITH UNSTABLE ANGINA PECTORIS: ICD-10-CM

## 2021-04-06 DIAGNOSIS — Z79.84 LONG TERM (CURRENT) USE OF ORAL HYPOGLYCEMIC DRUGS: ICD-10-CM

## 2021-04-06 DIAGNOSIS — Z88.0 ALLERGY STATUS TO PENICILLIN: ICD-10-CM

## 2021-04-06 DIAGNOSIS — E11.9 TYPE 2 DIABETES MELLITUS WITHOUT COMPLICATIONS: ICD-10-CM

## 2021-05-26 ENCOUNTER — EMERGENCY (EMERGENCY)
Facility: HOSPITAL | Age: 81
LOS: 1 days | Discharge: HOME | End: 2021-05-26
Attending: EMERGENCY MEDICINE | Admitting: EMERGENCY MEDICINE
Payer: COMMERCIAL

## 2021-05-26 VITALS
TEMPERATURE: 98 F | OXYGEN SATURATION: 98 % | DIASTOLIC BLOOD PRESSURE: 79 MMHG | RESPIRATION RATE: 18 BRPM | HEART RATE: 61 BPM | WEIGHT: 173.94 LBS | SYSTOLIC BLOOD PRESSURE: 199 MMHG | HEIGHT: 61 IN

## 2021-05-26 VITALS
SYSTOLIC BLOOD PRESSURE: 145 MMHG | RESPIRATION RATE: 18 BRPM | DIASTOLIC BLOOD PRESSURE: 72 MMHG | OXYGEN SATURATION: 100 % | HEART RATE: 62 BPM | TEMPERATURE: 98 F

## 2021-05-26 DIAGNOSIS — Z95.1 PRESENCE OF AORTOCORONARY BYPASS GRAFT: ICD-10-CM

## 2021-05-26 DIAGNOSIS — Z79.82 LONG TERM (CURRENT) USE OF ASPIRIN: ICD-10-CM

## 2021-05-26 DIAGNOSIS — M79.604 PAIN IN RIGHT LEG: ICD-10-CM

## 2021-05-26 DIAGNOSIS — R10.31 RIGHT LOWER QUADRANT PAIN: ICD-10-CM

## 2021-05-26 DIAGNOSIS — Z79.01 LONG TERM (CURRENT) USE OF ANTICOAGULANTS: ICD-10-CM

## 2021-05-26 DIAGNOSIS — I25.10 ATHEROSCLEROTIC HEART DISEASE OF NATIVE CORONARY ARTERY WITHOUT ANGINA PECTORIS: ICD-10-CM

## 2021-05-26 DIAGNOSIS — R07.9 CHEST PAIN, UNSPECIFIED: ICD-10-CM

## 2021-05-26 DIAGNOSIS — Z98.890 OTHER SPECIFIED POSTPROCEDURAL STATES: Chronic | ICD-10-CM

## 2021-05-26 LAB
ALBUMIN SERPL ELPH-MCNC: 3.4 G/DL — SIGNIFICANT CHANGE UP (ref 3.3–5)
ALP SERPL-CCNC: 98 U/L — SIGNIFICANT CHANGE UP (ref 40–120)
ALT FLD-CCNC: 15 U/L — SIGNIFICANT CHANGE UP (ref 10–45)
ANION GAP SERPL CALC-SCNC: 8 MMOL/L — SIGNIFICANT CHANGE UP (ref 5–17)
AST SERPL-CCNC: 21 U/L — SIGNIFICANT CHANGE UP (ref 10–40)
BASOPHILS # BLD AUTO: 0.01 K/UL — SIGNIFICANT CHANGE UP (ref 0–0.2)
BASOPHILS NFR BLD AUTO: 0.2 % — SIGNIFICANT CHANGE UP (ref 0–2)
BILIRUB SERPL-MCNC: 0.4 MG/DL — SIGNIFICANT CHANGE UP (ref 0.2–1.2)
BUN SERPL-MCNC: 15 MG/DL — SIGNIFICANT CHANGE UP (ref 7–23)
CALCIUM SERPL-MCNC: 9 MG/DL — SIGNIFICANT CHANGE UP (ref 8.4–10.5)
CHLORIDE SERPL-SCNC: 107 MMOL/L — SIGNIFICANT CHANGE UP (ref 96–108)
CO2 SERPL-SCNC: 26 MMOL/L — SIGNIFICANT CHANGE UP (ref 22–31)
CREAT SERPL-MCNC: 1.17 MG/DL — SIGNIFICANT CHANGE UP (ref 0.5–1.3)
EOSINOPHIL # BLD AUTO: 0.17 K/UL — SIGNIFICANT CHANGE UP (ref 0–0.5)
EOSINOPHIL NFR BLD AUTO: 4.2 % — SIGNIFICANT CHANGE UP (ref 0–6)
GLUCOSE SERPL-MCNC: 149 MG/DL — HIGH (ref 70–99)
HCT VFR BLD CALC: 36.5 % — SIGNIFICANT CHANGE UP (ref 34.5–45)
HGB BLD-MCNC: 11.7 G/DL — SIGNIFICANT CHANGE UP (ref 11.5–15.5)
IMM GRANULOCYTES NFR BLD AUTO: 0.2 % — SIGNIFICANT CHANGE UP (ref 0–1.5)
LYMPHOCYTES # BLD AUTO: 2.22 K/UL — SIGNIFICANT CHANGE UP (ref 1–3.3)
LYMPHOCYTES # BLD AUTO: 54.7 % — HIGH (ref 13–44)
MCHC RBC-ENTMCNC: 27.9 PG — SIGNIFICANT CHANGE UP (ref 27–34)
MCHC RBC-ENTMCNC: 32.1 GM/DL — SIGNIFICANT CHANGE UP (ref 32–36)
MCV RBC AUTO: 87.1 FL — SIGNIFICANT CHANGE UP (ref 80–100)
MONOCYTES # BLD AUTO: 0.4 K/UL — SIGNIFICANT CHANGE UP (ref 0–0.9)
MONOCYTES NFR BLD AUTO: 9.9 % — SIGNIFICANT CHANGE UP (ref 2–14)
NEUTROPHILS # BLD AUTO: 1.25 K/UL — LOW (ref 1.8–7.4)
NEUTROPHILS NFR BLD AUTO: 30.8 % — LOW (ref 43–77)
NRBC # BLD: 0 /100 WBCS — SIGNIFICANT CHANGE UP (ref 0–0)
PLATELET # BLD AUTO: 262 K/UL — SIGNIFICANT CHANGE UP (ref 150–400)
POTASSIUM SERPL-MCNC: 3.9 MMOL/L — SIGNIFICANT CHANGE UP (ref 3.5–5.3)
POTASSIUM SERPL-SCNC: 3.9 MMOL/L — SIGNIFICANT CHANGE UP (ref 3.5–5.3)
PROT SERPL-MCNC: 7.2 G/DL — SIGNIFICANT CHANGE UP (ref 6–8.3)
RBC # BLD: 4.19 M/UL — SIGNIFICANT CHANGE UP (ref 3.8–5.2)
RBC # FLD: 14.6 % — HIGH (ref 10.3–14.5)
SODIUM SERPL-SCNC: 141 MMOL/L — SIGNIFICANT CHANGE UP (ref 135–145)
TROPONIN T SERPL-MCNC: 0.01 NG/ML — SIGNIFICANT CHANGE UP (ref 0–0.01)
WBC # BLD: 4.06 K/UL — SIGNIFICANT CHANGE UP (ref 3.8–10.5)
WBC # FLD AUTO: 4.06 K/UL — SIGNIFICANT CHANGE UP (ref 3.8–10.5)

## 2021-05-26 PROCEDURE — 93005 ELECTROCARDIOGRAM TRACING: CPT

## 2021-05-26 PROCEDURE — 71045 X-RAY EXAM CHEST 1 VIEW: CPT | Mod: 26

## 2021-05-26 PROCEDURE — 71045 X-RAY EXAM CHEST 1 VIEW: CPT

## 2021-05-26 PROCEDURE — 93010 ELECTROCARDIOGRAM REPORT: CPT

## 2021-05-26 PROCEDURE — 36415 COLL VENOUS BLD VENIPUNCTURE: CPT

## 2021-05-26 PROCEDURE — 99285 EMERGENCY DEPT VISIT HI MDM: CPT

## 2021-05-26 PROCEDURE — 93926 LOWER EXTREMITY STUDY: CPT | Mod: 26,RT

## 2021-05-26 PROCEDURE — 99284 EMERGENCY DEPT VISIT MOD MDM: CPT | Mod: 25

## 2021-05-26 PROCEDURE — 85025 COMPLETE CBC W/AUTO DIFF WBC: CPT

## 2021-05-26 PROCEDURE — 93926 LOWER EXTREMITY STUDY: CPT

## 2021-05-26 PROCEDURE — 80053 COMPREHEN METABOLIC PANEL: CPT

## 2021-05-26 PROCEDURE — 84484 ASSAY OF TROPONIN QUANT: CPT

## 2021-05-26 RX ORDER — TRAMADOL HYDROCHLORIDE 50 MG/1
0.5 TABLET ORAL
Qty: 15 | Refills: 0
Start: 2021-05-26

## 2021-05-26 NOTE — ED PROVIDER NOTE - NSFOLLOWUPINSTRUCTIONS_ED_ALL_ED_FT
Take tramadol as prescribed.    Follow up with Dr. Collins this week.    Return to ED with worsening symptoms or other concerns.          Heart Catheterization    WHAT YOU NEED TO KNOW:    Heart catheterization is a procedure that helps diagnose and treat some heart problems. Healthcare providers can measure oxygen levels and pressures in your heart. They can also fix problems with the valves, blood vessels, or walls of your heart. You may need this procedure if you have chest pain, heart disease, or your heart is not working properly.    DISCHARGE INSTRUCTIONS:    Call your local emergency number (911 in the [place]) if:   •Your catheter site does not stop bleeding even after you apply firm pressure for 10 minutes.      •You have any of the following signs of a heart attack: ?Squeezing, pressure, or pain in your chest      ?You may also have any of the following: ?Discomfort or pain in your back, neck, jaw, stomach, or arm      ?Shortness of breath      ?Nausea or vomiting      ?Lightheadedness or a sudden cold sweat        •You have any of the following signs of a stroke: ?Numbness or drooping on one side of your face       ?Weakness in an arm or leg      ?Confusion or difficulty speaking      ?Dizziness, a severe headache, or vision loss      •You feel lightheaded, short of breath, or have chest pain.      •You cough up blood.      •You have trouble breathing.      Seek care immediately if:   •Blood soaks through your bandage.      •Your stitches come apart.      •Your arm or leg feels numb, cool, or looks pale.      •Your procedure area gets swollen quickly.      Call your doctor if:   •You have a fever or chills.      •Your procedure area is red, swollen, or draining pus.      •Your procedure area looks more bruised, or there is new bruising on the side of your leg or arm.      •You have nausea or are vomiting.      •Your skin is itchy, swollen, or you have a rash.      •You have questions or concerns about your condition or care.      Medicines: You may need any of the following:   •Blood thinners help prevent blood clots. Clots can cause strokes, heart attacks, and death. The following are general safety guidelines to follow while you are taking a blood thinner:?Watch for bleeding and bruising while you take blood thinners. Watch for bleeding from your gums or nose. Watch for blood in your urine and bowel movements. Use a soft washcloth on your skin, and a soft toothbrush to brush your teeth. This can keep your skin and gums from bleeding. If you shave, use an electric shaver. Do not play contact sports.       ?Tell your dentist and other healthcare providers that you take a blood thinner. Wear a bracelet or necklace that says you take this medicine.       ?Do not start or stop any other medicines unless your healthcare provider tells you to. Many medicines cannot be used with blood thinners.      ?Take your blood thinner exactly as prescribed by your healthcare provider. Do not skip does or take less than prescribed. Tell your provider right away if you forget to take your blood thinner, or if you take too much.      ?Warfarin is a blood thinner that you may need to take. The following are things you should be aware of if you take warfarin: ?Foods and medicines can affect the amount of warfarin in your blood. Do not make major changes to your diet while you take warfarin. Warfarin works best when you eat about the same amount of vitamin K every day. Vitamin K is found in green leafy vegetables and certain other foods. Ask for more information about what to eat when you are taking warfarin.      ?You will need to see your healthcare provider for follow-up visits when you are on warfarin. You will need regular blood tests. These tests are used to decide how much medicine you need.         •Acetaminophen decreases pain and fever. It is available without a doctor's order. Ask how much to take and how often to take it. Follow directions. Read the labels of all other medicines you are using to see if they also contain acetaminophen, or ask your doctor or pharmacist. Acetaminophen can cause liver damage if not taken correctly. Do not use more than 4 grams (4,000 milligrams) total of acetaminophen in one day.       •Take your medicine as directed. Contact your healthcare provider if you think your medicine is not helping or if you have side effects. Tell him or her if you are allergic to any medicine. Keep a list of the medicines, vitamins, and herbs you take. Include the amounts, and when and why you take them. Bring the list or the pill bottles to follow-up visits. Carry your medicine list with you in case of an emergency.      Care for your procedure area:   •Keep the area clean and dry. Do not take baths or go in hot tubs or pools. You may be able to shower the day after your procedure. Remove your pressure bandage before you shower. Carefully wash the procedure area with soap and water. Then pat the area dry. Change your bandage when it gets wet or dirty. Check the area every day for signs of infection, such as redness, swelling, or pus. Mild bruising is normal and expected. Do not put powders, lotions, or creams on the area.      •Apply firm, steady pressure if bleeding occurs. A small amount of bleeding from the area is possible. Apply pressure with a clean gauze or towel for 5 to 10 minutes. Call your local emergency number if bleeding becomes heavy or does not stop after 10 minutes of pressure.      •Do not lift anything heavier than 5 pounds until directed by your healthcare provider. Heavy lifting can put stress on the area and cause bleeding. Do not push or pull with the arm that was used for the procedure.      •Do not do vigorous activity for at least 48 hours. Vigorous activity may cause bleeding from the area. Rest and do quiet activities. Short walks to the bathroom and around the house are okay. Ask your healthcare provider when you can return to your normal activities.      •Limit stair climbing. This helps prevent bleeding from the area. Do not climb stairs more often each day than your provider says is okay.      Self-care:   •Your healthcare provider will tell you when you can drive and return to work and other daily activities.      •Drink liquids to flush the contrast liquid from your body and prevent blood clots. Ask how much liquid to drink each day and which liquids are best for you.      •Restart your blood thinners as directed. Your healthcare provider may tell you to start taking your blood thinners after your procedure, or he or she may have you wait a few days.      •Manage other medical conditions, if needed. Diabetes or high cholesterol increases your risk for another heart attack and stroke. Talk to your healthcare provider about your management plan. He or she will make a plan that helps you manage your conditions.      If you have a stent:   •Carry your stent card with you at all times.      •Let all healthcare providers know that you have a stent.      Follow up with your doctor as directed: Write down your questions so you remember to ask them during your visits.       © Copyright [employer] 2021           back to top                          © Copyright [employer] 2021

## 2021-05-26 NOTE — ED PROVIDER NOTE - PATIENT PORTAL LINK FT
You can access the FollowMyHealth Patient Portal offered by [facility] by registering at the following website: [email or web address]. By joining [employer]’s FollowMyHealth portal, you will also be able to view your health information using other applications (apps) compatible with our system.

## 2021-05-26 NOTE — ED PROVIDER NOTE - MUSCULOSKELETAL, MLM
Mild tenderness to palpation to distal  right groin area and tenderness to palpation to anterior upper right leg, no ecchymosis.

## 2021-05-26 NOTE — ED ADULT NURSE NOTE - OBJECTIVE STATEMENT
Patient is an 81yo F, arrived to ED via walk-in, AAOx3, in NAD, VSS, complaining of one week of intermittent CP.  Patient also complaining of right groin pain for two weeks.  Patient states pain is at cardiac cath insertion site.  Patient denies any SOB, N/V/D, fevers or any other complaints at this time.  PIV placed, labs drawn and sent, EKG done.

## 2021-05-26 NOTE — ED PROVIDER NOTE - OBJECTIVE STATEMENT
81 y/o F pt s/p cardiac catheterization [03/09/21 cardiac stent placed at [facility]], with a pmhx of CAD [compliant with aspirin and Plavix; x1 cardiac stent] presents to ED  complaining of right groin and pelvic pain that radiates to the upper right leg. Pt reports feeling well after surgery at [facility] on 03/09/2021 and is unsure of when she started to appreciate the pain; patient has not been aware of any bruising to the area. walking appreciated in groin. Patient reports pain is exacerbated upon moving/walking. Pt's cardiologist is Dr. Gloria Collins.

## 2021-05-26 NOTE — ED ADULT TRIAGE NOTE - CHIEF COMPLAINT QUOTE
x 1 week of CP. PT reports R groin pain since cardiac cath procedure on 3/29. denies bruising to R leg.  PMH of HTN, HLD, DM

## 2021-05-26 NOTE — ED PROVIDER NOTE - CLINICAL SUMMARY MEDICAL DECISION MAKING FREE TEXT BOX
80F x3 weeks status post cardiac catheterization presents to ED to the ED complaining of right groin/right leg pain.     Plan: Will administer pain management. Will order labs, CT abdomen & pelvis. Will consult Dr. Gloria Collins. Anticipate patient to be admitted. 80F x3 weeks status post cardiac catheterization presents to ED to the ED complaining of right groin/right leg pain.     Plan: Will administer pain management. Will order labs, ultrasound right lower ext. Will consult Dr. Gloria Collins. Anticipate patient to be discharged.  Labs ok - us neg for pseudoaneurysm, dvt, hematoma, ? nerve pain as source of pain - requesting meds stronger than tylenol, will prescribe tramadol.  Aware to follow up with Dr. Collins.  Aware to be cautious when taking tramadol.